# Patient Record
(demographics unavailable — no encounter records)

---

## 2019-03-19 NOTE — REP
CT ABDOMEN AND PELVIS WITHOUT CONTRAST:

 

CT abdomen and pelvis performed without oral or IV contrast. Sagittal and coronal

reconstruction images are performed.

 

Visualized lung bases are clear. Liver, spleen, adrenals, pancreas and kidneys

are grossly unremarkable. There is no renal or ureteral calculus and no evidence

of hydroureteronephrosis. No definite bladder calculus is seen. Bladder is not

optimally distended or optimally evaluated. There is no abdominal aortic

aneurysm. I see no gross adenopathy. There is no free air or free fluid. I see no

bowel wall thickening or evidence of appendicitis. I see no pelvic mass.

 

IMPRESSION:

 

No evidence of renal or ureteral calculus. No evidence of hydroureteronephrosis.

No evidence of appendicitis.

 

 

Electronically Signed by

Ariel Vera MD 03/20/2019 02:42 P